# Patient Record
Sex: FEMALE
[De-identification: names, ages, dates, MRNs, and addresses within clinical notes are randomized per-mention and may not be internally consistent; named-entity substitution may affect disease eponyms.]

---

## 2020-04-09 ENCOUNTER — RX ONLY (OUTPATIENT)
Age: 60
Setting detail: RX ONLY
End: 2020-04-09

## 2023-04-12 ENCOUNTER — APPOINTMENT (RX ONLY)
Dept: URBAN - NONMETROPOLITAN AREA CLINIC 37 | Facility: CLINIC | Age: 63
Setting detail: DERMATOLOGY
End: 2023-04-12

## 2023-04-12 DIAGNOSIS — L90.8 OTHER ATROPHIC DISORDERS OF SKIN: ICD-10-CM

## 2023-04-12 DIAGNOSIS — L81.4 OTHER MELANIN HYPERPIGMENTATION: ICD-10-CM

## 2023-04-12 DIAGNOSIS — L82.1 OTHER SEBORRHEIC KERATOSIS: ICD-10-CM

## 2023-04-12 DIAGNOSIS — I78.8 OTHER DISEASES OF CAPILLARIES: ICD-10-CM

## 2023-04-12 PROCEDURE — 99203 OFFICE O/P NEW LOW 30 MIN: CPT

## 2023-04-12 PROCEDURE — ? COUNSELING

## 2023-04-12 ASSESSMENT — LOCATION SIMPLE DESCRIPTION DERM
LOCATION SIMPLE: RIGHT FOREHEAD
LOCATION SIMPLE: RIGHT LIP
LOCATION SIMPLE: SCALP
LOCATION SIMPLE: LEFT EAR

## 2023-04-12 ASSESSMENT — LOCATION ZONE DERM
LOCATION ZONE: SCALP
LOCATION ZONE: LIP
LOCATION ZONE: EAR
LOCATION ZONE: FACE

## 2023-04-12 ASSESSMENT — LOCATION DETAILED DESCRIPTION DERM
LOCATION DETAILED: LEFT SUPERIOR FRONTAL SCALP
LOCATION DETAILED: RIGHT UPPER CUTANEOUS LIP
LOCATION DETAILED: RIGHT FOREHEAD
LOCATION DETAILED: LEFT SCAPHA
LOCATION DETAILED: RIGHT LATERAL FOREHEAD

## 2023-05-31 ENCOUNTER — APPOINTMENT (RX ONLY)
Dept: URBAN - NONMETROPOLITAN AREA CLINIC 34 | Facility: CLINIC | Age: 63
Setting detail: DERMATOLOGY
End: 2023-05-31

## 2023-05-31 DIAGNOSIS — Z41.9 ENCOUNTER FOR PROCEDURE FOR PURPOSES OTHER THAN REMEDYING HEALTH STATE, UNSPECIFIED: ICD-10-CM

## 2023-05-31 PROCEDURE — ? INVENTORY

## 2023-05-31 PROCEDURE — ? MICRONEEDLING

## 2023-05-31 NOTE — PROCEDURE: MICRONEEDLING
Depth In Mm (Location #3): 0.1
Treatment Number (Optional): 0
Consent: Written consent obtained, risks reviewed including but not limited to pain, scarring, infection and incomplete improvement.  Patient understands the procedure is cosmetic in nature and will require out of pocket payment.
Detail Level: Zone
Post-Care Instructions: After the procedure, take precautions agains sun exposure. Do not apply sunscreen for 12 hours after the procedure. Do not apply make-up for 12 hours after the procedure. Avoid alcohol based toners for 10-14 days. After 2-3 days patients can return to their regular skin regimen.

## 2023-06-27 ENCOUNTER — APPOINTMENT (RX ONLY)
Dept: URBAN - NONMETROPOLITAN AREA CLINIC 34 | Facility: CLINIC | Age: 63
Setting detail: DERMATOLOGY
End: 2023-06-27

## 2023-06-27 DIAGNOSIS — Z41.9 ENCOUNTER FOR PROCEDURE FOR PURPOSES OTHER THAN REMEDYING HEALTH STATE, UNSPECIFIED: ICD-10-CM

## 2023-06-27 PROCEDURE — ? INVENTORY

## 2023-06-27 PROCEDURE — ? MICRONEEDLING

## 2023-06-27 NOTE — PROCEDURE: MICRONEEDLING
Depth In Mm (Location #3): 0.4
Treatment Number (Optional): 2
Location #4: Chin
Consent: Written consent obtained, risks reviewed including but not limited to pain, scarring, infection and incomplete improvement.  Patient understands the procedure is cosmetic in nature and will require out of pocket payment.
Speed (Location #4): 6
Location #1: Forehead/Nose/Temples
Topical Anesthesia?: BLT cream (benzocaine 20%, lidocaine 10%, tetracaine 10%)
Depth In Mm (Location #6): 0.1
Length Of Topical Anesthesia Application (Optional): 20 minutes
Depth In Mm (Location #4): 0.6
Depth In Mm (Location #1): 0.2
Location #2: Cheeks
Depth In Mm (Location #2): 1
Detail Level: Zone
Location #3: Upper Lip
Post-Care Instructions: After the procedure, take precautions agains sun exposure. Do not apply sunscreen for 12 hours after the procedure. Do not apply make-up for 12 hours after the procedure. Avoid alcohol based toners for 10-14 days. After 2-3 days patients can return to their regular skin regimen.